# Patient Record
Sex: FEMALE | Race: OTHER | URBAN - METROPOLITAN AREA
[De-identification: names, ages, dates, MRNs, and addresses within clinical notes are randomized per-mention and may not be internally consistent; named-entity substitution may affect disease eponyms.]

---
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
## 2022-05-05 NOTE — ED PROCEDURE NOTE - NS ED ATTENDING STATEMENT MOD
"I have reviewed the surgical (or preoperative) H&P that is linked to this encounter, and examined the patient. There are no significant changes    Clinical Conditions Present on Arrival:  Clinically Significant Risk Factors Present on Admission                   # Obesity: Estimated body mass index is 31.74 kg/m  as calculated from the following:    Height as of 4/27/22: 1.6 m (5' 3\").    Weight as of 4/27/22: 81.3 kg (179 lb 3.2 oz).       "
Attending with
Attending with